# Patient Record
Sex: FEMALE | Race: WHITE
[De-identification: names, ages, dates, MRNs, and addresses within clinical notes are randomized per-mention and may not be internally consistent; named-entity substitution may affect disease eponyms.]

---

## 2019-02-28 ENCOUNTER — HOSPITAL ENCOUNTER (INPATIENT)
Dept: HOSPITAL 44 - OPSURG | Age: 24
LOS: 1 days | Discharge: HOME | DRG: 621 | End: 2019-03-01
Attending: NURSE PRACTITIONER | Admitting: NURSE PRACTITIONER
Payer: MEDICAID

## 2019-02-28 VITALS — BODY MASS INDEX: 40.3 KG/M2

## 2019-02-28 DIAGNOSIS — J45.909: ICD-10-CM

## 2019-02-28 DIAGNOSIS — M54.5: ICD-10-CM

## 2019-02-28 DIAGNOSIS — E66.01: Primary | ICD-10-CM

## 2019-02-28 PROCEDURE — 99231 SBSQ HOSP IP/OBS SF/LOW 25: CPT

## 2019-02-28 PROCEDURE — 43235 EGD DIAGNOSTIC BRUSH WASH: CPT

## 2019-02-28 PROCEDURE — 0DB64Z3 EXCISION OF STOMACH, PERCUTANEOUS ENDOSCOPIC APPROACH, VERTICAL: ICD-10-PCS | Performed by: SURGERY

## 2019-02-28 PROCEDURE — 99238 HOSP IP/OBS DSCHRG MGMT 30/<: CPT

## 2019-02-28 RX ADMIN — RETINOL, ERGOCALCIFEROL, .ALPHA.-TOCOPHEROL ACETATE, DL-, PHYTONADIONE, ASCORBIC ACID, NIACINAMIDE, RIBOFLAVIN 5-PHOSPHATE SODIUM, THIAMINE HYDROCHLORIDE, PYRIDOXINE HYDROCHLORIDE, DEXPANTHENOL, BIOTIN, FOLIC ACID, AND CYANOCOBALAMIN SCH MLS/HR: KIT at 15:00

## 2019-02-28 RX ADMIN — CEFAZOLIN SODIUM SCH GM: 1 SOLUTION INTRAVENOUS at 21:45

## 2019-02-28 RX ADMIN — FAMOTIDINE SCH MG: 10 INJECTION INTRAVENOUS at 21:40

## 2019-02-28 RX ADMIN — OXYCODONE HYDROCHLORIDE PRN MG: 5 SOLUTION ORAL at 21:22

## 2019-02-28 RX ADMIN — RETINOL, ERGOCALCIFEROL, .ALPHA.-TOCOPHEROL ACETATE, DL-, PHYTONADIONE, ASCORBIC ACID, NIACINAMIDE, RIBOFLAVIN 5-PHOSPHATE SODIUM, THIAMINE HYDROCHLORIDE, PYRIDOXINE HYDROCHLORIDE, DEXPANTHENOL, BIOTIN, FOLIC ACID, AND CYANOCOBALAMIN SCH MLS/HR: KIT at 21:15

## 2019-02-28 NOTE — HISTORY AND PHYSICAL REPORT
History of Present Illnes





- History of Present Illness


Reason for Visit: S/P Gastric Sleeve


History of Present Illness: 


Patient is a 23-year-old female who has tried multiple diets and exercise 

programs with no success. She has always struggled with her weight since she was

young. Patient and surgeon decided to proceed with gastric sleeve procedure.  

Procedure went well- patient will be admitted and monitored s/p surgical 

intervention. Patient has been on a liquid diet prior to surgery so she is a 

risk of dehydration s/p surgery. She will be admitted for IV hydration to help 

hydrate patient until she is able to tolerate a sufficient oral intake, will 

treat pain with IV medication until patient is able to tolerate oral meds, IV 

antiemetics to help reduce episodes of nausea and/or vomiting.  Patient will be 

monitored closely using telemetry. We will get patient up and ambulate frequen

tly and SCDs, while in bed to prevent DVTs.  Will encourage use of incentive 

spirometry to prevent resp. illness.  








- Past Medical History


Pulmonary: Asthma


CNS: Migraine


Musculoskeletal: Chronic low back pain


Endocrine: obesity


Dermatology: Eczema


Grav: 1


Para: 1





- Past Surgical History


Past Surgical History:  (x1), Other (dental extraction), Tonsillectomy





- Past Family History


  ** Mother


Family History: Other (obesity)





- Past Social History


Smoke: No


Occupation: Student


Alcohol: None


Drugs: None


Lives: With Family


Domestic Violence: Negative





- Health Maintenance


Health Maintenance: Cholesterol.  denies: Influenza Vaccine


Influenza Vaccine: No, Patient Refused


Pneumonia Vaccine: No


Resuscitation Status: 


Resusciation Status





Resuscitation Status             Full Code














- Unable to Obtain History


Unable to Obtain: No





Review of Systems





- Review of Systems


Constitutional: negative: Fever, Chills


Eyes: negative: pain


ENT: negative: Ear Pain, Nose Pain, Throat Pain


Respiratory: negative: Cough, Shortness of Breath


Cardiovascular: negative: Chest Pain, Light Headedness


Gastrointestinal: Nausea, Abdominal Pain (minimal s/p Gastric Sleeve).  neg

ative: Vomiting


Genitourinary: negative: Dysuria


Musculoskeletal: negative: Back Pain


Skin: Rash (to abdomen (present on arrival))


Neurological: negative: Weakness, Confusion





- Medications/Allergies


Allergies/Adverse Reactions: 


                                    Allergies











Allergy/AdvReac Type Severity Reaction Status Date / Time


 


No Known Allergies Allergy   Unverified 19 14:21














Home Medications: 


                                Home Medications





NK  19 











Current Inpatient Medications: 


                          Current Inpatient Medications





Cefazolin Sodium/Dextrose (Ancef 1 Gm/50 Ml-Dextrose)  1 gm IV Q8H [facility]


   Stop: 19 04:31


Enoxaparin Sodium (Lovenox)  40 mg SQ DAILY [facility]


   Stop: 03/15/19 08:59


Famotidine (Pepcid)  20 mg IVP BID [facility]


   Stop: 19 20:59


Promethazine HCl 25 mg/ Sodium (Chloride)  51 mls @ 200 mls/hr IV Q6 PRN


   PRN Reason: Nausea / Vomiting


   Stop: 19 14:20


Sodium Chloride (Normal Saline)  1,000 mls @ 150 mls/hr IV Q8H [facility]


Ketorolac Tromethamine (Toradol)  30 mg IVP Q6 PRN


   PRN Reason: For Mild Pain 


   Stop: 19 14:20


Morphine Sulfate (Morphine Sulfate)  4 mg IVP Q2 PRN


   PRN Reason: Severe Pain


   Stop: 19 14:20


Ondansetron HCl (Zofran)  4 mg IVP Q6H PRN


   PRN Reason: Nausea / Vomiting


   Stop: 19 14:20


Oxycodone HCl (Roxicodone)  5 mg PO Q4H PRN


   PRN Reason: PAIN














Exam





- Exam


General: Alert, Oriented to Person, Oriented to Place, Oriented to Time, 

Cooperative, No acute distress, Obese


HEENT: Atraumatic, PERRLA, Mouth Mucous membr. moist/Pink, Nose Mucous membr. 

moist/Pink


Neck: Normal Range of Motion


Carotids: 


2+


Lungs: Clear to auscultation, Normal air movement, Speaks full Sentences


Cardiovascular: Regular rate, Normal S1, Normal S2


Peripheral Edema: 


None


Peripheral Pulses: 


2+


Abdomen: Soft, Decreased Bowel Sounds


Integumentary: Normal, Pink, Warm, Dry, Other (incision site drsgs dry and 

intact)


Extremities: No edema, Normal pulses, No tenderness/swelling


Neurological: Normal gait, Normal speech, Strength Equal Bilat, Normal tone, 

Sensation intact


Psych/Mental Status: Mental status NL, Mood NL, Appropriate Affect





Assessment/Plan





- Assessment/Plan


(1) S/P gastric surgery


Status: Acute   Current Visit: Yes   


Plan: 


Plan to admit for IV hydration, IV pain meds, and IV antiemetics.  Lovenox and 

SCDs to help prevent DVTs, IS and frequent ambulation will be implemented.  

Start ice chips and advance diet as tolerated.








(2) Morbid obesity due to excess calories


Status: Acute   Current Visit: Yes   


Plan: 


Patient is s/p gastric sleeve.  We will assist patient with implementing gastric

sleeve diet protocol starting with ice chips and clear liquids and advancing as 

tolerated. 











(3) Asthma


Status: Acute   Current Visit: Yes   


Qualifiers: 


   Asthma severity: mild   Asthma persistence: unspecified   Asthma complication

type: unspecified   Qualified Code(s): J45.909 - Unspecified asthma, 

uncomplicated   


Plan: 


Will have patient use incentive spirometer frequently, frequent ambulation, will

order HFNs prn








(4) Chronic back pain


Status: Acute   Current Visit: Yes   


Qualifiers: 


   Back pain location: low back pain   Back pain laterality: unspecified   

Sciatica presence: unspecified whether sciatica present   Qualified Code(s): 

M54.5 - Low back pain; G89.29 - Other chronic pain   


Plan: 


Will have pt up and ambulating in the wong, will sit in chair for meals- may use

K-Pad if needed








VTE Assessment





- RISK FACTOR SCORE


VTE RISK FACTOR SCORES: OBESITY, MAJOR SURGERY/ANESTHESIA TIME > 1 HOUR





- RISK


VTE MODERATE RISK: SCORE OF 2 (RISK PROXIMAL DVT 2-4%) PROPHYAXIS NEEDED (

Lovenox daily, frequent ambulation, SCDs in bed)

## 2019-03-01 VITALS — SYSTOLIC BLOOD PRESSURE: 124 MMHG | DIASTOLIC BLOOD PRESSURE: 78 MMHG

## 2019-03-01 RX ADMIN — FAMOTIDINE SCH MG: 10 INJECTION INTRAVENOUS at 08:28

## 2019-03-01 RX ADMIN — RETINOL, ERGOCALCIFEROL, .ALPHA.-TOCOPHEROL ACETATE, DL-, PHYTONADIONE, ASCORBIC ACID, NIACINAMIDE, RIBOFLAVIN 5-PHOSPHATE SODIUM, THIAMINE HYDROCHLORIDE, PYRIDOXINE HYDROCHLORIDE, DEXPANTHENOL, BIOTIN, FOLIC ACID, AND CYANOCOBALAMIN SCH MLS/HR: KIT at 10:32

## 2019-03-01 RX ADMIN — CEFAZOLIN SODIUM SCH GM: 1 SOLUTION INTRAVENOUS at 04:07

## 2019-03-01 RX ADMIN — OXYCODONE HYDROCHLORIDE PRN MG: 5 SOLUTION ORAL at 12:53

## 2019-03-01 RX ADMIN — RETINOL, ERGOCALCIFEROL, .ALPHA.-TOCOPHEROL ACETATE, DL-, PHYTONADIONE, ASCORBIC ACID, NIACINAMIDE, RIBOFLAVIN 5-PHOSPHATE SODIUM, THIAMINE HYDROCHLORIDE, PYRIDOXINE HYDROCHLORIDE, DEXPANTHENOL, BIOTIN, FOLIC ACID, AND CYANOCOBALAMIN SCH MLS/HR: KIT at 00:34

## 2019-03-01 NOTE — DISCHARGE SUMMARY
Discharge Summary





- Discharge Sumary


History of Present Illness: 


Patient is a 23-year-old female who has tried multiple diets and exercise 

programs with no success. She has always struggled with her weight since she was

young. Patient and surgeon decided to proceed with gastric sleeve procedure.  

Procedure went well- patient will be admitted and monitored s/p surgical 

intervention. Patient has been on a liquid diet prior to surgery so she is a 

risk of dehydration s/p surgery. She will be admitted for IV hydration to help 

hydrate patient until she is able to tolerate a sufficient oral intake, will 

treat pain with IV medication until patient is able to tolerate oral meds, IV 

antiemetics to help reduce episodes of nausea and/or vomiting.  Patient will be 

monitored closely using telemetry. We will get patient up and ambulate 

frequently and SCDs, while in bed to prevent DVTs.  Will encourage use of 

incentive spirometry to prevent resp. illness.  





Home Medications: 


                                Ambulatory Orders











 Medication  Instructions  Recorded


 


NK  02/28/19











Allergies/Adverse Reactions: 


                                    Allergies











Allergy/AdvReac Type Severity Reaction Status Date / Time


 


No Known Allergies Allergy   Unverified 02/28/19 14:21











Discharge Summary: 


Patient is a 23-year-old female that underwent the gastric sleeve procedure and 

has done well.  She is really pushing to go home today.  She is doing very well.

 She is here with her grandma and 2 small children under the age of 2.  She 

states that her kids are really alot for her grandma to handle alone and if she 

went home today they would have more help at home. Patient has been very 

cooperative with her care by ambulating frequently, using her incentive 

spirometer, and wearing her SCDs while in bed.  She has been compliant with her 

diet during hospitalization.  She is having minimal discomfort at this time and 

minimal nausea- she has is passing gas and belching.  She is aware of discharge 

instructions and what she can and cannot do post surgical- she is aware of the 

strict diet she must follow to decrease discomfort and have success after 

procedure.  She has family support and grandmother will be taking her home- 

medications written by surgeon given to patient. She feels ready to go home and 

would really like to leave- she states that it is very difficult with her young 

children and grandmother.





Hospital Course: Patient received pain medications, antiemetics, and IVF and was

transitioned to oral. She has been up ambulating and using incentive spirometer.





- Final Diagnosis


(1) S/P gastric surgery


Problems: 


Incision without redness or drainage, positive bowel sounds, minimal discomfort,

belching and flatus, no extremity pain or edema


Right or Left: Right   





(2) Morbid obesity due to excess calories


Problems: 


Patient is following strict gastric sleeve diet


Right or Left: Right   





(3) Asthma


Problems: 


Stable without meds


Right or Left: Right   





(4) Chronic back pain


Problems: 


Stable


Right or Left: Right

## 2019-03-04 NOTE — OPERATIVE NOTE
PREOPERATIVE DIAGNOSIS:  

Morbid obesity.   



POSTOPERATIVE DIAGNOSIS:  

Morbid obesity.   



PROCEDURES PERFORMED:  

1. Laparoscopic vertical sleeve gastrectomy.  

2. Upper gastrointestinal endoscopy.    



SURGEON:

Tonny Justin M.D.



INDICATIONS FOR PROCEDURE:  Ms. Shannon Nash is a 23-year-old female who 
presented with features of morbid obesity.  She was noted to have a weight of 
207 pounds with a BMI of 41.9.  The patient was advised laparoscopic vertical 
sleeve gastrectomy and possible hiatal hernia repair.  The patient showed 
understanding and agreed to proceed.   



DESCRIPTION OF PROCEDURE:  After explaining to the patient in detail and 
informed consent was obtained, the patient was identified in the preoperative 
holding area.  The patient was transferred to the operating room and was placed 
in supine position.  Sequential compressive devices were placed for DVT 
prophylaxis.  Preoperative antibiotics were given.  After induction of 
anesthesia, the abdomen was prepped and draped in a sterile fashion.  Through a 
left upper quadrant 1-cm incision, and using Optiview technique, the peritoneal 
cavity was entered and pneumoperitoneum was created.  Thereafter, under direct 
vision, another 5-mm trocar was placed in the left midabdomen and another 15-mm 
trocar was placed in the right midabdomen.  Through a 1-cm incision in the right
subcostal region, another 5-mm trocar was placed.  Through a 1-cm incision in 
the epigastrium, a Nelda retractor was introduced and the left lobe of the 
liver was retracted.  On initial inspection, the patient was noted to have no 
evidence of hiatal hernia.  I took down the gastroepiploic vessels using a 
LigaSure.  This was continued superiorly.  The short gastric vessels were taken 
down.  The gastrophrenic ligament was divided and the Angle of His was 
mobilized.  The posterior attachments of the stomach on the pancreas were 
released.  Distally, the gastroepiploic vessels were taken down up to about 4 cm
proximal to the pylorus.  



At this point, a #38 [place] Hurst Bougie was introduced into the stomach and was
placed along the lesser curve.  The stomach was then divided in a vertical 
fashion with multiple Endo [employer] Black Load Staplers.  The first firing 
was directed outwards towards the greater curvature.  Subsequent firings were 
directed towards the Angle of His to create a loose sleeve around the #38 Cypriot
bougie.  The bougie was then removed and an upper GI endoscopy was performed at 
this point.  The scope was introduced into the esophagus and was gradually 
advanced into the stomach. The GE junction appeared normal.  The sleeve size 
appeared normal.  No evidence of any active bleeding was noted.  The stomach was
insufflated with air and irrigation of fluid along the staple line revealed no 
evidence of air leak.  The stomach was then suctioned out and the scope was 
removed.  Absolute hemostasis was ensured.  Thorough saline irrigation was 
given.  The Nelda retractor was removed. Approximately 10 mL of a lidocaine-
Marcaine mix was instilled under the left hemidiaphragm. The sleeve gastrectomy 
specimen was removed.  The abdomen was then deflated.  The incisions were closed
with 4-0 Monocryl.  Dermabond was applied.  Approximately 10 mL of a lidocaine-
Marcaine mix was injected into all the incisions. The patient was awakened from 
anesthesia and was transferred to the recovery room in stable condition.  



ESTIMATED BLOOD LOSS:  

Approximately 10 mL. 



CONDITION OF THE PATIENT:  

Stable.   



FLUIDS GIVEN:  

Per Anesthesia note.  



SPECIMEN(S) SENT:

Sleeve gastrectomy specimen.  



COMPLICATIONS:

None.   



ANESTHESIA:

General.

   



Tonny Justin M.D.

MADELIN/pamela

Job #[id number] 

D: 03/03/19 @ 2127

T: 03/04/19 @ 1418

MTDD